# Patient Record
Sex: FEMALE | NOT HISPANIC OR LATINO | URBAN - METROPOLITAN AREA
[De-identification: names, ages, dates, MRNs, and addresses within clinical notes are randomized per-mention and may not be internally consistent; named-entity substitution may affect disease eponyms.]

---

## 2024-11-16 ENCOUNTER — OFFICE VISIT (OUTPATIENT)
Dept: URGENT CARE | Age: 34
End: 2024-11-16
Payer: COMMERCIAL

## 2024-11-16 VITALS
SYSTOLIC BLOOD PRESSURE: 100 MMHG | BODY MASS INDEX: 28.7 KG/M2 | TEMPERATURE: 98.7 F | HEART RATE: 83 BPM | OXYGEN SATURATION: 98 % | RESPIRATION RATE: 20 BRPM | WEIGHT: 152 LBS | HEIGHT: 61 IN | DIASTOLIC BLOOD PRESSURE: 58 MMHG

## 2024-11-16 DIAGNOSIS — J02.9 ACUTE PHARYNGITIS, UNSPECIFIED ETIOLOGY: Primary | ICD-10-CM

## 2024-11-16 DIAGNOSIS — H69.92 DYSFUNCTION OF LEFT EUSTACHIAN TUBE: ICD-10-CM

## 2024-11-16 DIAGNOSIS — H92.02 LEFT EAR PAIN: ICD-10-CM

## 2024-11-16 LAB — POC RAPID STREP: NEGATIVE

## 2024-11-16 ASSESSMENT — PAIN SCALES - GENERAL: PAINLEVEL_OUTOF10: 0-NO PAIN

## 2024-11-17 NOTE — PROGRESS NOTES
"Subjective   Patient ID: Chanda Martin is a 34 y.o. female. They present today with a chief complaint of Sore Throat (PT STATED SORE THROAT AND L EAR ACHE. X1 DAY. PT IS ALSO 28 WEEKS PREGNANT. nO PAIN SHE STATED).    History of Present Illness  Chanda is a pleasant 34-year-old female who is pregnant and presents for evaluation of acute left ear ache with sore throat for about 1 day.  Patient states she is approximately 28 weeks pregnant and developed ear ache and sore throat over the past day.  Patient denies significant coughing or fever.  Patient states ear infections have been going around the household and would like this assessed and ruled out.  Patient denies chest pain or difficulty breathing.  Patient has not attempted any over-the-counter treatments.  No other symptoms or concerns otherwise reported.    Past Medical History  Allergies as of 11/16/2024    (No Known Allergies)       (Not in a hospital admission)       No past medical history on file.    No past surgical history on file.     reports that she has never smoked. She has never used smokeless tobacco.    Review of Systems  A 10-point review of systems was performed, otherwise unremarkable unless stated in the history of present illness.                Objective    Vitals:    11/16/24 1951   BP: 100/58   BP Location: Right arm   Patient Position: Sitting   BP Cuff Size: Adult   Pulse: 83   Resp: 20   Temp: 37.1 °C (98.7 °F)   TempSrc: Oral   SpO2: 98%   Weight: 68.9 kg (152 lb)   Height: 1.549 m (5' 1\")     No LMP recorded.    Gen: Vitals noted and reviewed, no evidence of acute distress, well developed and afebrile.   Psych: Mood and affect appropriate for setting.  Head/Face: Atraumatic and normocephalic.   Neuro: No focal deficits noted.  ENT: TM on the left with clear fluid level without evidence of surrounding erythema or exudative effusion or signs of perforation, TM on the right is otherwise clear and unremarkable, EACs unremarkable bilaterally. " Mastoids non-tender. Posterior oropharynx with erythema, without exudate, or swelling. Uvula is in the midline and non-edematous.   Neck: Supple. No meningismus through full range of motion. No lymphadenopathy.   Cardiac: Regular rate and rhythm no murmur.   Lungs: Clear to auscultation throughout, No evidence of wheezing, rhonchi or crackles. Good aeration throughout.   Extremities: Symmetrical, No peripheral edema  Skin: Without evidence of ecchymosis, wounds, or rashes.      Point of Care Test & Imaging Results from this visit  Results for orders placed or performed in visit on 11/16/24   POCT rapid strep A manually resulted   Result Value Ref Range    POC Rapid Strep Negative Negative      No results found.    Diagnostic study results (if any) were reviewed by Stephany Garcia DO.    Assessment/Plan   Allergies, medications, history, and pertinent labs/EKGs/Imaging reviewed by Stephany Garcia DO.     Medical Decision Making  Discussed with the patient symptoms and clinical presentation findings suggestive of an acute viral upper respiratory illness with pharyngitis of unclear etiology in the setting of left otalgia likely secondary to eustachian tube dysfunction.  We advised close symptom monitoring supportive treatment and use of over-the-counter remedies as approved by OB gynecologist.  We advise consulting with OB prior to starting any OTC treatments for her current symptoms.  We advise close follow-up with primary care provider as well as established OB gynecologist for reassessment. We advised seeking immediate emergency medical attention if symptoms fail to improve, worsen or any concerning symptoms arise. Patient voiced full understanding and agreement to plan.      Orders and Diagnoses  Diagnoses and all orders for this visit:  Acute pharyngitis, unspecified etiology  -     POCT rapid strep A manually resulted  Left ear pain  Dysfunction of left eustachian tube      Medical Admin Record      Patient  disposition: Home    Electronically signed by Stephany Garcia DO  8:37 PM

## 2024-11-17 NOTE — PATIENT INSTRUCTIONS
Follow up with PCP and established OB/Gynecologist. We advised seeking immediate emergency medical attention if symptoms fail to improve, worsen or any concerning symptoms arise. Patient voiced full understanding and agreement to plan.

## 2025-09-16 ENCOUNTER — APPOINTMENT (OUTPATIENT)
Dept: PEDIATRICS | Facility: CLINIC | Age: 35
End: 2025-09-16